# Patient Record
Sex: MALE | URBAN - METROPOLITAN AREA
[De-identification: names, ages, dates, MRNs, and addresses within clinical notes are randomized per-mention and may not be internally consistent; named-entity substitution may affect disease eponyms.]

---

## 2024-03-02 ENCOUNTER — OFFICE VISIT (OUTPATIENT)
Dept: URGENT CARE | Facility: CLINIC | Age: 79
End: 2024-03-02
Payer: MEDICARE

## 2024-03-02 VITALS
HEART RATE: 88 BPM | TEMPERATURE: 97.4 F | RESPIRATION RATE: 16 BRPM | DIASTOLIC BLOOD PRESSURE: 80 MMHG | WEIGHT: 265 LBS | SYSTOLIC BLOOD PRESSURE: 154 MMHG | OXYGEN SATURATION: 95 %

## 2024-03-02 DIAGNOSIS — S61.313A LACERATION OF LEFT MIDDLE FINGER WITHOUT FOREIGN BODY WITH DAMAGE TO NAIL, INITIAL ENCOUNTER: Primary | ICD-10-CM

## 2024-03-02 PROCEDURE — 1159F MED LIST DOCD IN RCRD: CPT | Performed by: PHYSICIAN ASSISTANT

## 2024-03-02 PROCEDURE — 90714 TD VACC NO PRESV 7 YRS+ IM: CPT | Performed by: PHYSICIAN ASSISTANT

## 2024-03-02 PROCEDURE — 1036F TOBACCO NON-USER: CPT | Performed by: PHYSICIAN ASSISTANT

## 2024-03-02 PROCEDURE — 1126F AMNT PAIN NOTED NONE PRSNT: CPT | Performed by: PHYSICIAN ASSISTANT

## 2024-03-02 PROCEDURE — 99203 OFFICE O/P NEW LOW 30 MIN: CPT | Performed by: PHYSICIAN ASSISTANT

## 2024-03-02 PROCEDURE — 90471 IMMUNIZATION ADMIN: CPT | Performed by: PHYSICIAN ASSISTANT

## 2024-03-02 PROCEDURE — 12002 RPR S/N/AX/GEN/TRNK2.6-7.5CM: CPT | Performed by: PHYSICIAN ASSISTANT

## 2024-03-02 RX ORDER — AMOXICILLIN AND CLAVULANATE POTASSIUM 875; 125 MG/1; MG/1
1 TABLET, FILM COATED ORAL 2 TIMES DAILY
Qty: 10 TABLET | Refills: 0 | Status: SHIPPED | OUTPATIENT
Start: 2024-03-02 | End: 2024-03-07

## 2024-03-02 ASSESSMENT — PAIN SCALES - GENERAL: PAINLEVEL: 0-NO PAIN

## 2024-03-02 ASSESSMENT — ENCOUNTER SYMPTOMS: WOUND: 1

## 2024-03-02 NOTE — PROGRESS NOTES
Subjective   Patient ID: Cesar Goncalves is a 78 y.o. male.    Patient is a 78-year-old male who complains of a laceration to the distal aspect of his left third finger secondary to an injury that he sustained prior to arrival today.  Patient reports that he was working at home when he injured his distal left third finger on a pair of pruning viviane.  Patient reports that he did partially lacerate the fingernail to the left third finger.  Patient denies paresthesia or paralysis to his left further finger and states that he is able to flex and extend same without difficulty.  Patient reports no foreign body to the wound.  Patient describes moderate capillary bleeding initially which has since subsided.  Patient does not take anticoagulant medication.  Patient states that the remaining left fingers and hand are asymptomatic and uninjured.  Patient is left-hand dominant.  Patient states he does not remember the date of his last tetanus immunization.      The following portions of the chart were reviewed this encounter and updated as appropriate:       Review of Systems   Skin:  Positive for wound.        Laceration Left Third Finger   All other systems reviewed and are negative.  Objective   Physical Exam  Vitals and nursing note reviewed.   Constitutional:       Appearance: Normal appearance. He is normal weight.   Cardiovascular:      Pulses: Normal pulses.   Pulmonary:      Effort: Pulmonary effort is normal.      Breath sounds: Normal breath sounds.   Musculoskeletal:         General: Tenderness and signs of injury present. No swelling or deformity. Normal range of motion.   Skin:     General: Skin is warm and dry.      Capillary Refill: Capillary refill takes less than 2 seconds.      Findings: No bruising or erythema.      Comments: 1.5 cm linear laceration is noted to the radial aspect of the distal left third finger.  There is a 0.5 cm extension of the laceration through the radial aspect of the left third  fingernail.  Wound depth is 2 mm and the underlying fascial structures are fully intact.  There is no visible tendon, muscle or bone tissue.  There is no bleeding noted on exam.  MSP to the left third finger is fully intact and the patient demonstrates full range of motion to same.  Muscle strength is noted to be 5/5.  Wound edges are very sharp and well-defined and there is no skin avulsion or foreign body.   Neurological:      General: No focal deficit present.      Mental Status: He is alert and oriented to person, place, and time.   Psychiatric:         Mood and Affect: Mood normal.         Behavior: Behavior normal.         Thought Content: Thought content normal.         Judgment: Judgment normal.       Laceration Repair    Date/Time: 3/2/2024 3:40 PM    Performed by: Dre Sales PA-C  Authorized by: Dre Sales PA-C    Comments:      Wound was cleaned and irrigated per routine.  2 mL 1% lidocaine without epinephrine was infiltrated to the site with excellent effect noted.  Three 5-0 Vicryl sutures were placed myself in simple interrupted fashion with excellent approximation of the wound edges.  Antibiotic ointment was applied and sterile dressing placed and secured with Coban.  MSP is noted to be fully intact pre- and post-suture placement.  Patient tolerated the procedure very well.  Assessment/Plan   Physical exam findings as noted above.  Patient was given a tetanus immunization by the RN.  Patient was provided with a prescription for Augmentin 875-125 mg for prophylaxis and wound care instructions were discussed.  Patient was advised that the sutures are absorbable and do not need to be removed.  Patient verbalizes clear understanding of all of the above instructions.    CLINICAL IMPRESSION:  1.5 cm Laceration Distal Left Third Finger    Diagnoses and all orders for this visit:  Laceration of left middle finger without foreign body with damage to nail, initial encounter  -     amoxicillin-pot  clavulanate (Augmentin) 875-125 mg tablet; Take 1 tablet by mouth 2 times a day for 5 days.  -     Td vaccine, age 7 years and older (TDVAX)  Other orders  -     Laceration Repair

## 2024-04-13 ENCOUNTER — OFFICE VISIT (OUTPATIENT)
Dept: URGENT CARE | Facility: CLINIC | Age: 79
End: 2024-04-13
Payer: MEDICARE

## 2024-04-13 VITALS
WEIGHT: 270 LBS | RESPIRATION RATE: 16 BRPM | TEMPERATURE: 98.3 F | DIASTOLIC BLOOD PRESSURE: 101 MMHG | HEART RATE: 92 BPM | SYSTOLIC BLOOD PRESSURE: 165 MMHG | OXYGEN SATURATION: 93 %

## 2024-04-13 DIAGNOSIS — K04.7 DENTAL INFECTION: Primary | ICD-10-CM

## 2024-04-13 PROCEDURE — 1159F MED LIST DOCD IN RCRD: CPT | Performed by: PHYSICIAN ASSISTANT

## 2024-04-13 PROCEDURE — 1036F TOBACCO NON-USER: CPT | Performed by: PHYSICIAN ASSISTANT

## 2024-04-13 PROCEDURE — 1125F AMNT PAIN NOTED PAIN PRSNT: CPT | Performed by: PHYSICIAN ASSISTANT

## 2024-04-13 PROCEDURE — 99213 OFFICE O/P EST LOW 20 MIN: CPT | Performed by: PHYSICIAN ASSISTANT

## 2024-04-13 RX ORDER — PENICILLIN V POTASSIUM 500 MG/1
500 TABLET, FILM COATED ORAL 4 TIMES DAILY
Qty: 40 TABLET | Refills: 0 | Status: SHIPPED | OUTPATIENT
Start: 2024-04-13 | End: 2024-04-23

## 2024-04-13 ASSESSMENT — PAIN SCALES - GENERAL: PAINLEVEL: 9

## 2024-04-13 NOTE — PROGRESS NOTES
Subjective   Patient ID: Cesar Goncalves is a 78 y.o. male.    Patient is a 78-year-old male who complains of worsening right mandibular molar pain that he has been experiencing for approximately the past 4 days.  Patient denies bleeding, serous or purulent discharge to his gingiva.  Patient states he is able to tolerate food and fluids although it is painful to do so.  Patient denies fever, chills or other systemic illness symptoms.  Patient states he did contact his dentist's office on Monday and was instructed to report to this urgent care facility for evaluation.  Patient states that he does have an appointment scheduled with his dentist for Wednesday, 17 April 2024.      Dental Pain  The following portions of the chart were reviewed this encounter and updated as appropriate:       Review of Systems   HENT:  Positive for dental problem.    All other systems reviewed and are negative.  Objective   Physical Exam  Vitals and nursing note reviewed.   Constitutional:       Appearance: Normal appearance. He is normal weight.   HENT:      Head: Normocephalic and atraumatic.      Nose: Nose normal.      Mouth/Throat:      Mouth: Mucous membranes are moist.      Pharynx: Oropharynx is clear. No oropharyngeal exudate or posterior oropharyngeal erythema.   Eyes:      Extraocular Movements: Extraocular movements intact.      Conjunctiva/sclera: Conjunctivae normal.      Pupils: Pupils are equal, round, and reactive to light.   Cardiovascular:      Pulses: Normal pulses.   Pulmonary:      Effort: Pulmonary effort is normal.      Breath sounds: Normal breath sounds.   Skin:     General: Skin is warm and dry.      Capillary Refill: Capillary refill takes less than 2 seconds.   Neurological:      General: No focal deficit present.      Mental Status: He is alert and oriented to person, place, and time.      Cranial Nerves: No cranial nerve deficit.   Psychiatric:         Mood and Affect: Mood normal.         Behavior: Behavior  normal.         Thought Content: Thought content normal.         Judgment: Judgment normal.     Assessment/Plan   Physical exam findings as noted above.  Patient was provided with a prescription for penicillin  mg and advised to report for his appointment with his dentist as scheduled on Wednesday.    CLINICAL IMPRESSION:  Dental Infection;  Dental Pain    Diagnoses and all orders for this visit:  Dental infection  -     penicillin v potassium (Veetid) 500 mg tablet; Take 1 tablet (500 mg) by mouth 4 times a day for 10 days.    Patient disposition: Home